# Patient Record
Sex: FEMALE | Race: WHITE | NOT HISPANIC OR LATINO
[De-identification: names, ages, dates, MRNs, and addresses within clinical notes are randomized per-mention and may not be internally consistent; named-entity substitution may affect disease eponyms.]

---

## 2018-12-06 ENCOUNTER — FORM ENCOUNTER (OUTPATIENT)
Age: 43
End: 2018-12-06

## 2018-12-06 PROBLEM — Z00.00 ENCOUNTER FOR PREVENTIVE HEALTH EXAMINATION: Status: ACTIVE | Noted: 2018-12-06

## 2018-12-07 ENCOUNTER — APPOINTMENT (OUTPATIENT)
Dept: RADIOLOGY | Facility: CLINIC | Age: 43
End: 2018-12-07

## 2018-12-07 ENCOUNTER — APPOINTMENT (OUTPATIENT)
Dept: ORTHOPEDIC SURGERY | Facility: CLINIC | Age: 43
End: 2018-12-07
Payer: MEDICAID

## 2018-12-07 ENCOUNTER — OUTPATIENT (OUTPATIENT)
Dept: OUTPATIENT SERVICES | Facility: HOSPITAL | Age: 43
LOS: 1 days | End: 2018-12-07
Payer: COMMERCIAL

## 2018-12-07 VITALS — BODY MASS INDEX: 20.73 KG/M2 | WEIGHT: 140 LBS | RESPIRATION RATE: 16 BRPM | HEIGHT: 69 IN

## 2018-12-07 DIAGNOSIS — Z80.9 FAMILY HISTORY OF MALIGNANT NEOPLASM, UNSPECIFIED: ICD-10-CM

## 2018-12-07 DIAGNOSIS — Z87.39 PERSONAL HISTORY OF OTHER DISEASES OF THE MUSCULOSKELETAL SYSTEM AND CONNECTIVE TISSUE: ICD-10-CM

## 2018-12-07 DIAGNOSIS — Z82.62 FAMILY HISTORY OF OSTEOPOROSIS: ICD-10-CM

## 2018-12-07 PROBLEM — Z00.00 ENCOUNTER FOR PREVENTIVE HEALTH EXAMINATION: Noted: 2018-12-07

## 2018-12-07 PROCEDURE — 73564 X-RAY EXAM KNEE 4 OR MORE: CPT

## 2018-12-07 PROCEDURE — 99204 OFFICE O/P NEW MOD 45 MIN: CPT

## 2018-12-07 PROCEDURE — 73564 X-RAY EXAM KNEE 4 OR MORE: CPT | Mod: 26,LT,RT

## 2018-12-07 RX ORDER — MELOXICAM 15 MG/1
15 TABLET ORAL DAILY
Qty: 30 | Refills: 2 | Status: ACTIVE | COMMUNITY
Start: 2018-12-07 | End: 1900-01-01

## 2018-12-14 ENCOUNTER — OTHER (OUTPATIENT)
Age: 43
End: 2018-12-14

## 2018-12-17 ENCOUNTER — FORM ENCOUNTER (OUTPATIENT)
Age: 43
End: 2018-12-17

## 2018-12-17 ENCOUNTER — OTHER (OUTPATIENT)
Age: 43
End: 2018-12-17

## 2018-12-18 ENCOUNTER — APPOINTMENT (OUTPATIENT)
Dept: MRI IMAGING | Facility: CLINIC | Age: 43
End: 2018-12-18
Payer: MEDICAID

## 2018-12-18 ENCOUNTER — OUTPATIENT (OUTPATIENT)
Dept: OUTPATIENT SERVICES | Facility: HOSPITAL | Age: 43
LOS: 1 days | End: 2018-12-18

## 2018-12-18 PROCEDURE — 73721 MRI JNT OF LWR EXTRE W/O DYE: CPT | Mod: 26,RT

## 2018-12-18 PROCEDURE — 73721 MRI JNT OF LWR EXTRE W/O DYE: CPT | Mod: 26,LT,76

## 2018-12-20 ENCOUNTER — TRANSCRIPTION ENCOUNTER (OUTPATIENT)
Age: 43
End: 2018-12-20

## 2018-12-21 ENCOUNTER — APPOINTMENT (OUTPATIENT)
Dept: ORTHOPEDIC SURGERY | Facility: CLINIC | Age: 43
End: 2018-12-21
Payer: MEDICAID

## 2018-12-21 VITALS — BODY MASS INDEX: 20.73 KG/M2 | RESPIRATION RATE: 16 BRPM | WEIGHT: 140 LBS | HEIGHT: 69 IN

## 2018-12-21 DIAGNOSIS — M22.42 CHONDROMALACIA PATELLAE, LEFT KNEE: ICD-10-CM

## 2018-12-21 PROCEDURE — 99215 OFFICE O/P EST HI 40 MIN: CPT

## 2019-03-03 ENCOUNTER — TRANSCRIPTION ENCOUNTER (OUTPATIENT)
Age: 44
End: 2019-03-03

## 2020-05-08 ENCOUNTER — TRANSCRIPTION ENCOUNTER (OUTPATIENT)
Age: 45
End: 2020-05-08

## 2020-12-29 ENCOUNTER — APPOINTMENT (OUTPATIENT)
Dept: OTOLARYNGOLOGY | Facility: CLINIC | Age: 45
End: 2020-12-29
Payer: MEDICAID

## 2020-12-29 VITALS
WEIGHT: 140 LBS | BODY MASS INDEX: 20.73 KG/M2 | HEART RATE: 81 BPM | OXYGEN SATURATION: 97 % | HEIGHT: 69 IN | DIASTOLIC BLOOD PRESSURE: 79 MMHG | TEMPERATURE: 98 F | SYSTOLIC BLOOD PRESSURE: 130 MMHG

## 2020-12-29 DIAGNOSIS — H90.A31 MIXED CONDUCTIVE AND SENSORINEURAL HEARING LOSS, UNILATERAL, RIGHT EAR WITH RESTRICTED HEARING ON THE  CONTRALATERAL SIDE: ICD-10-CM

## 2020-12-29 DIAGNOSIS — R51.9 HEADACHE, UNSPECIFIED: ICD-10-CM

## 2020-12-29 PROCEDURE — 99204 OFFICE O/P NEW MOD 45 MIN: CPT | Mod: 25

## 2020-12-29 PROCEDURE — 31231 NASAL ENDOSCOPY DX: CPT

## 2020-12-29 PROCEDURE — 99072 ADDL SUPL MATRL&STAF TM PHE: CPT

## 2020-12-29 PROCEDURE — 99203 OFFICE O/P NEW LOW 30 MIN: CPT | Mod: 25

## 2020-12-29 PROCEDURE — 92504 EAR MICROSCOPY EXAMINATION: CPT

## 2020-12-29 NOTE — DATA REVIEWED
[de-identified] : 1/18: R mod-mod/sev MHL, L mild to mod CHL\par today: declined [de-identified] : POC rapid strep today: neg

## 2020-12-29 NOTE — HISTORY OF PRESENT ILLNESS
[de-identified] : L>R ear and global head pain for 3 days without obvious worsening of her hearing or drainage (now 3/10); she also c/o pain behind her L eye w/ slightly blurry vision and some lacrimation. No hx clusters; a few sinus infections/yr. Uses qtips. She is concerned that she has an ear infection. She also has a sore throat which is hard to distinguish from her ear pain. No hoarseness or dysphagia. \par Known hearing loss suspected to be otosclerosis & she brings an audio from 2018 showing R>L mixed losses. Her hearing seems stable overall but fluctuates; trouble in noisy places. Occas R>L nonpulsatile tinnitus not worse recently. \par \par Covid-19 screening questions: \par   Sick contacts: no\par   Recent international travel: no\par   Shortness of breath: no\par   New or productive cough: no\par   Fevers/chills/night sweats: no\par   COVID-19 testing: neg Ab & swab 2 wks ago\par

## 2020-12-29 NOTE — ASSESSMENT
[FreeTextEntry1] : Given the multiple complaints which are difficult to reconcile with a single process, we discussed possible cluster HAs in conjunction w/ a viral URI or another evolving process. She will RTC in several days for an audio and recheck. Recommended that she obtain neuro & ophtho evals (she has both docs already).

## 2020-12-29 NOTE — PHYSICAL EXAM
[Binocular Microscopic Exam] : Binocular microscopic exam was performed [Nasal Endoscopy Performed] : nasal endoscopy was performed, see procedure section for findings [Laryngoscopy Performed] : laryngoscopy was performed, see procedure section for findings [Normal] : no rashes

## 2020-12-29 NOTE — PROCEDURE
[FreeTextEntry6] : We discussed the elevated risk of liberation of viral particles from the nasopharynx if the patient were to be asymptomatically infected with COVID-19; after weighing the risks & benefits the decision was made to proceed. The procedure was performed while wearing appropriate PPE and a camera attached to a video system was used to maximize separation from the patient. The scope was handled appropriately. \par Indication: requirement for exam not possible via anterior rhinoscopy; r/o sinus infection causing eye pain\par After verbal consent and the administration of an aerosolized phenylephrine/lidocaine mix, examination was performed with a flexible endoscope. Findings:\par Septum: without significant deviation or impingement on other anatomic structures\par Mucosa: normal\par Polyposis: not present\par Inferior turbinates: unremarkable\par Middle and superior turbinates: normal\par Inferior meatus: unremarkable\par Middle meatus: unremarkable\par Superior meatus: unremarkable\par Speno-ethmoidal recess: unremarkable\par Nasopharynx: unremarkable\par Secretions: unremarkable\par Other findings: none\par  [de-identified] : Indication: requirement for exam not possible via anterior examination; r/o referred pain w/ multiple complaints\par After verbal consent and the administration of an aerosolized phenylephrine/lidocaine mix, examination was performed with a flexible endoscope placed through the nose. Findings:\par Nasopharynx: unremarkable\par Soft palate, lateral and posterior pharyngeal walls: unremarkable\par Base of tongue & lingual tonsil: minimal retrodisplacement\par Vallecula: unremarkable\par Epiglottis: unremarkable\par Piriform sinuses and pharyngoesophageal junction: unremarkable\par Arytenoids and AE folds: mild interarytenoid edema\par Ventricle and false vocal folds: unremarkable\par True vocal folds: Smooth free edge; surface without ectasias or edema; normal movement bilaterally with good apposition in phonation\par Visible subglottis: unremarkable\par Other findings: ELM

## 2021-01-07 ENCOUNTER — APPOINTMENT (OUTPATIENT)
Dept: OTOLARYNGOLOGY | Facility: CLINIC | Age: 46
End: 2021-01-07

## 2023-03-22 ENCOUNTER — APPOINTMENT (OUTPATIENT)
Dept: ORTHOPEDIC SURGERY | Facility: CLINIC | Age: 48
End: 2023-03-22
Payer: MEDICAID

## 2023-03-22 ENCOUNTER — NON-APPOINTMENT (OUTPATIENT)
Age: 48
End: 2023-03-22

## 2023-03-22 VITALS
SYSTOLIC BLOOD PRESSURE: 130 MMHG | HEART RATE: 88 BPM | HEIGHT: 69 IN | OXYGEN SATURATION: 98 % | BODY MASS INDEX: 20.73 KG/M2 | WEIGHT: 140 LBS | DIASTOLIC BLOOD PRESSURE: 78 MMHG

## 2023-03-22 DIAGNOSIS — Z60.2 PROBLEMS RELATED TO LIVING ALONE: ICD-10-CM

## 2023-03-22 DIAGNOSIS — M24.9 JOINT DERANGEMENT, UNSPECIFIED: ICD-10-CM

## 2023-03-22 DIAGNOSIS — Z78.9 OTHER SPECIFIED HEALTH STATUS: ICD-10-CM

## 2023-03-22 PROCEDURE — 99203 OFFICE O/P NEW LOW 30 MIN: CPT

## 2023-03-22 PROCEDURE — 73564 X-RAY EXAM KNEE 4 OR MORE: CPT | Mod: LT

## 2023-03-22 SDOH — SOCIAL STABILITY - SOCIAL INSECURITY: PROBLEMS RELATED TO LIVING ALONE: Z60.2

## 2023-03-22 NOTE — PHYSICAL EXAM
[de-identified] : General: Patient is a well-appearing female in no apparent distress. She is alert and oriented x 3. Vital signs are within normal limits.  No sign of fevers or infectious symptoms.  \par Hygiene: Excellent\par HEENT: Atraumatic with no asymmetry.  Neck motion is normal. No overt hearing deficits.\par Pulmonary: Breathing comfortably.\par Gastrointestinal: Is not obese.  \par Psych: Responding appropriately with appropriate affect. Patient does not demonstrate tangential thought, perseveration or anxiety.\par Vascular: No rashes or obvious skin abnormalities in either lower extremity. Capillary refill is <2sec. Good distal perfusion.\par Neurovascular:\par Varicose veins absent. 5/5 strength with hip flexion, knee extension, ankle dorsiflexion, ankle plantar flexion, and EHL. Sensation is intact over the lower extremity in L2-S1 nerve distributions. 2+ dorsalis pedis and posterior tibial pulses \par \par  [de-identified] : Gait: She walks with an antalgic gait\par \par Inspection:\par Knee appears with mild/moderate/severe effusion\par No ulcerations observed\par \par Palpation:\par Tenderness to palpation of the medial/patellar joint line\par \par Range of Motion:		\par Right: 3-95 with pain			\par Left: 0-125\par \par Painful ROM on left. Bilateral knee stable to varus/valgus and ant/post stress.\par \par The knee is swollen in comparison to the opposite knee\par \par Both hips are stable no sign of dislocation or subluxation on exam with good pain free ROM.\par \par \par \par \par \par  [de-identified] : 4 views of the left knee reviewed.  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

## 2023-03-22 NOTE — REVIEW OF SYSTEMS
[Joint Pain] : joint pain [Joint Stiffness] : joint stiffness [Joint Swelling] : joint swelling [Negative] : Heme/Lymph [Fever] : no fever [Chills] : no chills [Feeling Tired] : no fatigue

## 2023-03-22 NOTE — DISCUSSION/SUMMARY
[de-identified] : Right knee swelling pain and stiffness after injury standing and twisting.  She does have evidence for some degenerative arthritis and hypermobility of her patella and this may be an exacerbation of that but I cannot rule out underlying internal derangement as well.  Given the significant pain and swelling I think an MRI study to evaluate further is prudent.  We discussed symptomatic treatments as well including use of Aleve or Motrin oppressive Ace wrap which we applied and frequent icing.  We will follow-up after the MRI study is accomplished to determine next steps.

## 2023-03-22 NOTE — DISCUSSION/SUMMARY
[de-identified] : Right knee swelling pain and stiffness after injury standing and twisting.  She does have evidence for some degenerative arthritis and hypermobility of her patella and this may be an exacerbation of that but I cannot rule out underlying internal derangement as well.  Given the significant pain and swelling I think an MRI study to evaluate further is prudent.  We discussed symptomatic treatments as well including use of Aleve or Motrin oppressive Ace wrap which we applied and frequent icing.  We will follow-up after the MRI study is accomplished to determine next steps.

## 2023-03-22 NOTE — PHYSICAL EXAM
[de-identified] : General: Patient is a well-appearing female in no apparent distress. She is alert and oriented x 3. Vital signs are within normal limits.  No sign of fevers or infectious symptoms.  \par Hygiene: Excellent\par HEENT: Atraumatic with no asymmetry.  Neck motion is normal. No overt hearing deficits.\par Pulmonary: Breathing comfortably.\par Gastrointestinal: Is not obese.  \par Psych: Responding appropriately with appropriate affect. Patient does not demonstrate tangential thought, perseveration or anxiety.\par Vascular: No rashes or obvious skin abnormalities in either lower extremity. Capillary refill is <2sec. Good distal perfusion.\par Neurovascular:\par Varicose veins absent. 5/5 strength with hip flexion, knee extension, ankle dorsiflexion, ankle plantar flexion, and EHL. Sensation is intact over the lower extremity in L2-S1 nerve distributions. 2+ dorsalis pedis and posterior tibial pulses \par \par  [de-identified] : Gait: She walks with an antalgic gait\par \par Inspection:\par Knee appears with mild/moderate/severe effusion\par No ulcerations observed\par \par Palpation:\par Tenderness to palpation of the medial/patellar joint line\par \par Range of Motion:		\par Right: 3-95 with pain			\par Left: 0-125\par \par Painful ROM on left. Bilateral knee stable to varus/valgus and ant/post stress.\par \par The knee is swollen in comparison to the opposite knee\par \par Both hips are stable no sign of dislocation or subluxation on exam with good pain free ROM.\par \par \par \par \par \par  [de-identified] : 4 views of the left knee reviewed.  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

## 2023-03-22 NOTE — HISTORY OF PRESENT ILLNESS
[de-identified] : NORMA GUTIERREZ is a pleasant 47 year female . NORMA GUTIERREZ complains of right knee pain over the past several years which worsened yesterday. Pain is located in the global region of the knee but is worse medially and in the patellar/global region of the knee and does not radiate. She denies prior injury or trauma but notes the knee pain worsened after she was sitting at an appointment and stood up. She notes the pain is worse with activity- walking/taking stairs and rates it 9 out of 10 at its worst. She denies mechanical symptoms including catching, locking, buckling but the knee is swollen. She has been treated non-surgically with ice and activity modification. Denies any fevers and chills. Denies any fevers and chills.\par

## 2023-03-22 NOTE — HISTORY OF PRESENT ILLNESS
[de-identified] : NORMA GUTIERREZ is a pleasant 47 year female . NORMA GUTIERREZ complains of right knee pain over the past several years which worsened yesterday. Pain is located in the global region of the knee but is worse medially and in the patellar/global region of the knee and does not radiate. She denies prior injury or trauma but notes the knee pain worsened after she was sitting at an appointment and stood up. She notes the pain is worse with activity- walking/taking stairs and rates it 9 out of 10 at its worst. She denies mechanical symptoms including catching, locking, buckling but the knee is swollen. She has been treated non-surgically with ice and activity modification. Denies any fevers and chills. Denies any fevers and chills.\par

## 2023-03-27 ENCOUNTER — OUTPATIENT (OUTPATIENT)
Dept: OUTPATIENT SERVICES | Facility: HOSPITAL | Age: 48
LOS: 1 days | End: 2023-03-27

## 2023-03-27 ENCOUNTER — APPOINTMENT (OUTPATIENT)
Dept: MRI IMAGING | Facility: CLINIC | Age: 48
End: 2023-03-27
Payer: MEDICAID

## 2023-03-27 PROCEDURE — 73721 MRI JNT OF LWR EXTRE W/O DYE: CPT | Mod: 26,RT

## 2023-03-29 ENCOUNTER — NON-APPOINTMENT (OUTPATIENT)
Age: 48
End: 2023-03-29

## 2023-09-06 ENCOUNTER — APPOINTMENT (OUTPATIENT)
Dept: ORTHOPEDIC SURGERY | Facility: CLINIC | Age: 48
End: 2023-09-06
Payer: MEDICAID

## 2023-09-06 VITALS — HEIGHT: 69 IN | RESPIRATION RATE: 16 BRPM | WEIGHT: 140 LBS | BODY MASS INDEX: 20.73 KG/M2

## 2023-09-06 DIAGNOSIS — M17.11 UNILATERAL PRIMARY OSTEOARTHRITIS, RIGHT KNEE: ICD-10-CM

## 2023-09-06 DIAGNOSIS — Q68.2 CONGENITAL DEFORMITY OF KNEE: ICD-10-CM

## 2023-09-06 PROCEDURE — 99214 OFFICE O/P EST MOD 30 MIN: CPT

## 2023-09-07 DIAGNOSIS — M17.0 BILATERAL PRIMARY OSTEOARTHRITIS OF KNEE: ICD-10-CM

## 2023-09-07 RX ORDER — MELOXICAM 15 MG/1
15 TABLET ORAL DAILY
Qty: 3 | Refills: 3 | Status: ACTIVE | COMMUNITY
Start: 2023-09-07 | End: 1900-01-01

## 2023-09-11 ENCOUNTER — APPOINTMENT (OUTPATIENT)
Dept: ORTHOPEDIC SURGERY | Facility: CLINIC | Age: 48
End: 2023-09-11

## 2023-09-27 ENCOUNTER — APPOINTMENT (OUTPATIENT)
Dept: ORTHOPEDIC SURGERY | Facility: CLINIC | Age: 48
End: 2023-09-27